# Patient Record
Sex: MALE | NOT HISPANIC OR LATINO | URBAN - METROPOLITAN AREA
[De-identification: names, ages, dates, MRNs, and addresses within clinical notes are randomized per-mention and may not be internally consistent; named-entity substitution may affect disease eponyms.]

---

## 2017-02-07 ENCOUNTER — OFFICE VISIT (OUTPATIENT)
Dept: OPHTHALMOLOGY | Facility: CLINIC | Age: 62
End: 2017-02-07
Attending: OPHTHALMOLOGY
Payer: MEDICAID

## 2017-02-07 DIAGNOSIS — H40.009 GLAUCOMA SUSPECT: ICD-10-CM

## 2017-02-07 DIAGNOSIS — H40.003 GLAUCOMA SUSPECT, BOTH EYES: Primary | ICD-10-CM

## 2017-02-07 DIAGNOSIS — H25.13 SENILE NUCLEAR SCLEROSIS, BILATERAL: ICD-10-CM

## 2017-02-07 PROCEDURE — 99214 OFFICE O/P EST MOD 30 MIN: CPT | Mod: 25,ZF

## 2017-02-07 PROCEDURE — 92083 EXTENDED VISUAL FIELD XM: CPT | Mod: ZF | Performed by: OPHTHALMOLOGY

## 2017-02-07 PROCEDURE — T1013 SIGN LANG/ORAL INTERPRETER: HCPCS | Mod: U3,ZF | Performed by: OPHTHALMOLOGY

## 2017-02-07 ASSESSMENT — VISUAL ACUITY
OS_CC: 20/50
METHOD: SNELLEN - LINEAR
CORRECTION_TYPE: GLASSES
OD_CC: 20/50

## 2017-02-07 ASSESSMENT — REFRACTION_WEARINGRX
OD_CYLINDER: +0.25
OS_CYLINDER: +1.00
OS_AXIS: 171
OS_SPHERE: -0.50
OD_ADD: +2.50
SPECS_TYPE: BIFOCAL
OD_SPHERE: +0.25
OS_ADD: +2.50
OD_AXIS: 175

## 2017-02-07 ASSESSMENT — CUP TO DISC RATIO
OS_RATIO: 0.85
OD_RATIO: 0.7

## 2017-02-07 ASSESSMENT — EXTERNAL EXAM - LEFT EYE: OS_EXAM: 1+ BROW PTOSIS

## 2017-02-07 ASSESSMENT — EXTERNAL EXAM - RIGHT EYE: OD_EXAM: 1+ BROW PTOSIS

## 2017-02-07 ASSESSMENT — SLIT LAMP EXAM - LIDS
COMMENTS: DERMATOCHALASIS
COMMENTS: DERMATOCHALASIS

## 2017-02-07 ASSESSMENT — TONOMETRY
IOP_METHOD: APPLANATION
OS_IOP_MMHG: 11
OD_IOP_MMHG: 11

## 2017-02-07 NOTE — PROGRESS NOTES
1)?LTG vs Glaucoma Suspect -- H/O noncompliance with gtts, Improved gtts compliance with wife putting gtts in, CT/MRI in 2010 showed Cerebral atrophy and was o/w WNL, eval with Dr. Hanks (etiology of visual field constriction felt to be likely 2/2 poor test taking as CVF is better) -- K pachy: 536/533   Tmax: 22/21    HVF: GVF:Severe constriction OS>>OD and GVF:[place] at HP, fair fixation ?etiology glc (GVF constriction out of proportion to ONH cupping ?2/2 poor test taking)     CDR:0.7/0.75     HRT/OCT: Severe RNFL thinning OU  (not worth repeating)     FHX of Glc:  No    Gonio: open      Intolerant to:      Asthma/COPD:Yes, on inhalers   Steroid Use: Yes, creams and nasal inhaled    Kidney Stones: No    Sulfa Allergy: No     IOP targets:?Mteens -- consider SLT given compliance issues, GVF constriction is out of proportion to cupping  2)DM s DR  3)Hx of Paranoid Schizophrenia, Anemia and latent TB s/p INH tx -- following with Dr. Sanchez (PCP)  4)NS OU -- ?etiology of vision loss    Patient will continue on Latanoprost which is a teal top drop at bedtime in both eyes and Simbrinza (Brinzolamide/Brimonidine) which is a white top drop 3x/day (8 hours apart) in both eyes.  Emphasized medication compliance (family members are administering drops). Patient will return to the glaucoma clinic in 8-12 months with IOL master, repeat IOP check, Henry Visual field test and dilated eye exam

## 2017-02-07 NOTE — NURSING NOTE
Chief Complaints and History of Present Illnesses   Patient presents with     Glaucoma Suspect Follow Up     8 month follow up both eyes     HPI    Affected eye(s):  Both   Symptoms:     Floaters (Comment: Still seeing floaters, no changes.)   No flashes   No redness   No Dryness         Do you have eye pain now?:  No      Comments:  Pt states vision is the same as last visit.  DMII BS: 125 this morning  A1C: unknown to pt.  No results found for this basename: a1c    Ailyn MANCILLA February 7, 2017 9:38 AM

## 2017-02-07 NOTE — MR AVS SNAPSHOT
After Visit Summary   2/7/2017    Shandra Santana    MRN: [id number]           Patient Information     Date Of Birth          1955        Visit Information        Provider Department      2/7/2017 9:15 AM Tiffanie Lizama MD; [facility]        Today's Diagnoses     Glaucoma suspect, both eyes    -  1     Glaucoma suspect         Senile nuclear sclerosis, bilateral           Care Instructions    Patient will continue on Latanoprost which is a teal top drop at bedtime in both eyes and Simbrinza (Brinzolamide/Brimonidine) which is a white top drop 3x/day (8 hours apart) in both eyes.  Emphasized medication compliance (family members are administering drops). Patient will return to the glaucoma clinic in 8-12 months with IOL master, repeat IOP check, Henry Visual field test and dilated eye exam          Follow-ups after your visit        Follow-up notes from your care team     Return for 8-12 months with IOL master, repeat IOP check, Henry Visual field test and dilated eye exam.      Your next 10 appointments already scheduled     Sep 12, 2017  9:30 AM   VISUAL FIELD with [facility] EYE VISUAL FIELD   Eye Clinic ([facility])    [address]  [address]  [facility] [address]   [phone]            Sep 12, 2017 10:00 AM   RETURN GLAUCOMA with Tiffanie Lizama MD   Eye Clinic ([facility])    [address]  [address] [facility]  [address] [address]  [facility] [address]   [phone]              Who to contact     Please call your clinic at [phone] to:    Ask questions about your health    Make or cancel appointments    Discuss your medicines    Learn about your test results    Speak to your doctor   If you have compliments or concerns about an experience at your clinic, or if you wish to file a complaint, please contact [facility] Patient Relations at [phone] or email us at  [email or web address]         Additional Information About Your Visit        [employer] Information     [employer] is an electronic gateway that provides easy, online access to your medical records. With [employer], you can request a clinic appointment, read your test results, renew a prescription or communicate with your care team.     To sign up for [employer] visit the website at [email or web address]   You will be asked to enter the access code listed below, as well as some personal information. Please follow the directions to create your username and password.     Your access code is: [email or web address]  Expires: 2017  9:00 AM     Your access code will  in 90 days. If you need help or a new code, please contact your [facility] or call [phone] for assistance.        Care EveryWhere ID     This is your Care EveryWhere ID. This could be used by other organizations to access your [place] medical records  [id number]         Blood Pressure from Last 3 Encounters:   No data found for BP    Weight from Last 3 Encounters:   No data found for Wt              We Performed the Following     Elaine HANLEY        Primary Care Provider Office Phone # Fax #    Lazaro Javed [phone]       [address] [address]        Thank you!     Thank you for choosing EYE CLINIC  for your care. Our goal is always to provide you with excellent care. Hearing back from our patients is one way we can continue to improve our services. Please take a few minutes to complete the written survey that you may receive in the mail after your visit with us. Thank you!             Your Updated Medication List - Protect others around you: Learn how to safely use, store and throw away your medicines at www.disposemymeds.org.          This list is accurate as of: 17 11:27 AM.  Always use your most recent med list.                   Brand Name  Dispense Instructions for use    albuterol 108 (90 BASE) MCG/ACT Inhaler   Generic drug:  albuterol      Inhale 2 puffs into the lungs as needed       AMITIZA 24 MCG capsule   Generic drug:  lubiprostone      Take 24 mcg by mouth 2 times daily With food       amLODIPine 5 MG tablet    NORVASC     Take 5 mg by mouth daily       brimonidine 0.15 % ophthalmic solution    ALPHAGAN-P    1 Bottle    Place 1 drop into both eyes 3 times daily       brinzolamide 1 % ophthalmic susp    AZOPT    1 Bottle    Place 1 drop into both eyes 3 times daily       brinzolamide-brimonidine 1-0.2 % ophthalmic suspension    SIMBRINZA    1 Bottle    Place 1 drop into both eyes 3 times daily       buPROPion 150 MG 24 hr tablet    WELLBUTRIN XL     Take 150 mg by mouth daily       calcium-vitamin D 250-125 MG-UNIT Tabs per tablet    OSCAL     Take 1 tablet by mouth daily       doxepin 25 MG capsule    SINEquan     Take 25 mg by mouth At Bedtime       DULCOLAX 10 MG Suppository   Generic drug:  bisacodyl      Place 10 mg rectally as needed       ferrous sulfate 325 (65 FE) MG tablet    IRON     Take 325 mg by mouth 3 times daily       GLIPIZIDE PO      Take 10 mg by mouth 2 times daily (before meals)       JANUVIA 100 MG tablet   Generic drug:  sitagliptin      Take 100 mg by mouth daily       ketoconazole 2 % cream    NIZORAL     Apply 1 Squirt topically 2 times daily       LISINOPRIL PO      Take 2.5 mg by mouth daily       LOXAPINE SUCCINATE PO      Take 25 mg by mouth 3 times daily       meclizine 25 MG tablet    ANTIVERT     Take 25 mg by mouth as needed BID PRN       METFORMIN HCL PO      Take 1,000 mg by mouth 2 times daily       metoprolol 25 MG tablet    LOPRESSOR     Take 25 mg by mouth 2 times daily       NAPROXEN PO      Take 500 mg by mouth 2 times daily       OMEPRAZOLE PO      Take 40 mg by mouth every morning (before breakfast)       * polyethylene glycol powder    MIRALAX/GLYCOLAX     Take 1 capful by mouth daily       *  polyethylene glycol powder    MIRALAX/GLYCOLAX     Take 17 g by mouth daily In 8oz of liquid       REMERON PO      Take 15 mg by mouth At Bedtime       sennosides 8.6 MG tablet    SENOKOT     Take 1 tablet by mouth daily       * SEROQUEL PO      Take 400 mg by mouth At Bedtime       * SEROQUEL PO      Take 50 mg by mouth daily       TRUSOPT 2 % ophthalmic solution   Generic drug:  dorzolamide      Place 2 drops into both eyes 3 times daily       * Notice:  This list has 4 medication(s) that are the same as other medications prescribed for you. Read the directions carefully, and ask your doctor or other care provider to review them with you.

## 2017-02-07 NOTE — PATIENT INSTRUCTIONS
Patient will continue on Latanoprost which is a teal top drop at bedtime in both eyes and Simbrinza (Brinzolamide/Brimonidine) which is a white top drop 3x/day (8 hours apart) in both eyes.  Emphasized medication compliance (family members are administering drops). Patient will return to the glaucoma clinic in 8-12 months with IOL master, repeat IOP check, Henry Visual field test and dilated eye exam

## 2017-08-08 DIAGNOSIS — H40.1131 PRIMARY OPEN ANGLE GLAUCOMA OF BOTH EYES, MILD STAGE: Primary | ICD-10-CM

## 2017-08-08 RX ORDER — BRINZOLAMIDE 10 MG/ML
1 SUSPENSION/ DROPS OPHTHALMIC 3 TIMES DAILY
Qty: 10 ML | Refills: 4 | Status: SHIPPED | OUTPATIENT
Start: 2017-08-08 | End: 2017-11-21 | Stop reason: DRUGHIGH

## 2017-11-21 ENCOUNTER — OFFICE VISIT (OUTPATIENT)
Dept: OPHTHALMOLOGY | Facility: CLINIC | Age: 62
End: 2017-11-21
Attending: OPHTHALMOLOGY
Payer: MEDICAID

## 2017-11-21 DIAGNOSIS — H25.13 SENILE NUCLEAR SCLEROSIS, BILATERAL: ICD-10-CM

## 2017-11-21 DIAGNOSIS — H40.003 GLAUCOMA SUSPECT, BOTH EYES: Primary | ICD-10-CM

## 2017-11-21 DIAGNOSIS — H40.003 GLAUCOMA SUSPECT OF BOTH EYES: ICD-10-CM

## 2017-11-21 PROCEDURE — 99214 OFFICE O/P EST MOD 30 MIN: CPT | Mod: 25,ZF

## 2017-11-21 PROCEDURE — T1013 SIGN LANG/ORAL INTERPRETER: HCPCS | Mod: U3,ZF | Performed by: OPHTHALMOLOGY

## 2017-11-21 PROCEDURE — T1013 SIGN LANG/ORAL INTERPRETER: HCPCS | Mod: U3,ZF

## 2017-11-21 PROCEDURE — 76516 ECHO EXAM OF EYE: CPT | Mod: ZF | Performed by: OPHTHALMOLOGY

## 2017-11-21 PROCEDURE — 92083 EXTENDED VISUAL FIELD XM: CPT | Mod: ZF | Performed by: OPHTHALMOLOGY

## 2017-11-21 RX ORDER — LATANOPROST 50 UG/ML
1 SOLUTION/ DROPS OPHTHALMIC AT BEDTIME
COMMUNITY
End: 2018-02-15

## 2017-11-21 ASSESSMENT — TONOMETRY
OS_IOP_MMHG: 22
IOP_METHOD: TONOPEN
OD_IOP_MMHG: 21

## 2017-11-21 ASSESSMENT — REFRACTION_WEARINGRX
OS_AXIS: 171
OS_SPHERE: -0.50
OD_CYLINDER: +0.25
OD_SPHERE: +0.25
OD_ADD: +2.50
SPECS_TYPE: BIFOCAL
OS_CYLINDER: +1.00
OS_ADD: +2.50
OD_AXIS: 175

## 2017-11-21 ASSESSMENT — VISUAL ACUITY
OS_CC: 20/50
OD_CC: 20/70
OD_CC+: +1
OS_CC+: -2
CORRECTION_TYPE: GLASSES
METHOD: SNELLEN - LINEAR

## 2017-11-21 ASSESSMENT — EXTERNAL EXAM - RIGHT EYE: OD_EXAM: 1+ BROW PTOSIS

## 2017-11-21 ASSESSMENT — SLIT LAMP EXAM - LIDS
COMMENTS: DERMATOCHALASIS
COMMENTS: DERMATOCHALASIS

## 2017-11-21 ASSESSMENT — CONF VISUAL FIELD
METHOD: COUNTING FINGERS
OD_NORMAL: 1
OS_NORMAL: 1

## 2017-11-21 ASSESSMENT — EXTERNAL EXAM - LEFT EYE: OS_EXAM: 1+ BROW PTOSIS

## 2017-11-21 ASSESSMENT — CUP TO DISC RATIO
OS_RATIO: 0.7
OD_RATIO: 0.7

## 2017-11-21 NOTE — NURSING NOTE
Chief Complaints and History of Present Illnesses   Patient presents with     Glaucoma Follow Up     9 month follow up both eyes     HPI    Affected eye(s):  Both   Symptoms:     No floaters   No flashes   No redness   No Dryness         Do you have eye pain now?:  No      Comments:  Pt states vision is the same as last visit. No eye pain today. Pt feeling very dizzy for the past few months. Pt notes that over the past 2 weeks dizziness has become worse.  DM2 BS: 150 yesterday morning.  A1C: unknown to pt.  No results found for: A1C    Ailyn MANCILLA November 21, 2017 7:37 AM

## 2017-11-21 NOTE — MR AVS SNAPSHOT
After Visit Summary   11/21/2017    Shandra Santana    MRN: [id number]           Patient Information     Date Of Birth          1955        Visit Information        Provider Department      11/21/2017 7:45 AM Tiffanie Lizama MD; [facility]        Today's Diagnoses     Glaucoma suspect, both eyes    -  1    Glaucoma suspect of both eyes        Senile nuclear sclerosis, bilateral          Care Instructions    Patient will continue on Latanoprost which is a teal top drop at bedtime in both eyes and Simbrinza (Brinzolamide/Brimonidine) which is a white top drop 3x/day (8 hours apart) in both eyes.  Emphasized medication compliance (family members are administering drops). Patient will return to the glaucoma clinic in 1-2 months with repeat IOP check.            Follow-ups after your visit        Follow-up notes from your care team     Return 2 months with repeat IOP check.      Your next 10 appointments already scheduled     Jan 23, 2018  8:00 AM CST   RETURN GLAUCOMA with Tiffanie Lizama MD   Eye Clinic ([facility])    [address] [facility]  [address] [facility]  [address] Clin [address]  [facility] [address] [phone]              Who to contact     Please call your clinic at [phone] to:    Ask questions about your health    Make or cancel appointments    Discuss your medicines    Learn about your test results    Speak to your doctor   If you have compliments or concerns about an experience at your clinic, or if you wish to file a complaint, please contact [facility] Patient Relations at [phone] or email us at [email or web address]         Additional Information About Your Visit        [employer] Information     [employer] is an electronic gateway that provides easy, online access to your medical records. With [employer], you can request a clinic appointment, read your test results, renew a prescription or communicate with  your care team.     To sign up for [employer] visit the website at [email or web address]   You will be asked to enter the access code listed below, as well as some personal information. Please follow the directions to create your username and password.     Your access code is: [email or web address]  Expires: 2017  5:30 AM     Your access code will  in 90 days. If you need help or a new code, please contact your [facility] or call [phone] for assistance.        Care EveryWhere ID     This is your Care EveryWhere ID. This could be used by other organizations to access your [place] medical records  [id number]         Blood Pressure from Last 3 Encounters:   No data found for BP    Weight from Last 3 Encounters:   No data found for Wt              We Performed the Following     Biometry w/o IOL calc OU (both eyes)     Henry VF OU          Today's Medication Changes          These changes are accurate as of: 17  9:43 AM.  If you have any questions, ask your nurse or doctor.               Stop taking these medicines if you haven't already. Please contact your care team if you have questions.     brinzolamide 1 % ophthalmic susp   Commonly known as:  AZOPT   Stopped by:  Tiffanie Lizama MD                    Primary Care Provider Office Phone # Fax #    Lazaro Burt [phone]       [address] [address]        Equal Access to Services     АННА CORTEZ AH: Hadii aad ku hadasho Sosherinali, waaxda luqadaha, qaybta kaalmada adeegyada, tanesha bello. So [facility] [phone].    ATENCIÓN: Si habla español, tiene a rowland disposición servicios gratuitos de asistencia lingüística. Arlene al [phone].    We comply with applicable federal civil rights laws and [place] laws. We do not discriminate on the basis of race, color, national origin, age, disability, sex, sexual orientation, or gender  identity.            Thank you!     Thank you for choosing EYE CLINIC  for your care. Our goal is always to provide you with excellent care. Hearing back from our patients is one way we can continue to improve our services. Please take a few minutes to complete the written survey that you may receive in the mail after your visit with us. Thank you!             Your Updated Medication List - Protect others around you: Learn how to safely use, store and throw away your medicines at www.disposemymeds.org.          This list is accurate as of: 11/21/17  9:43 AM.  Always use your most recent med list.                   Brand Name Dispense Instructions for use Diagnosis    AMITIZA 24 MCG capsule   Generic drug:  lubiprostone      Take 24 mcg by mouth 2 times daily With food        amLODIPine 5 MG tablet    NORVASC     Take 5 mg by mouth daily        brinzolamide-brimonidine 1-0.2 % ophthalmic suspension    SIMBRINZA    1 Bottle    Place 1 drop into both eyes 3 times daily    Low-tension glaucoma of both eyes, unspecified glaucoma stage       buPROPion 150 MG 24 hr tablet    WELLBUTRIN XL     Take 150 mg by mouth daily        calcium-vitamin D 250-125 MG-UNIT Tabs per tablet    OSCAL     Take 1 tablet by mouth daily        doxepin 25 MG capsule    SINEquan     Take 25 mg by mouth At Bedtime        DULCOLAX 10 MG Suppository   Generic drug:  bisacodyl      Place 10 mg rectally as needed        ferrous sulfate 325 (65 FE) MG tablet    IRON     Take 325 mg by mouth 3 times daily        GLIPIZIDE PO      Take 10 mg by mouth 2 times daily (before meals)        JANUVIA 100 MG tablet   Generic drug:  sitagliptin      Take 100 mg by mouth daily        ketoconazole 2 % cream    NIZORAL     Apply 1 Squirt topically 2 times daily        latanoprost 0.005 % ophthalmic solution    XALATAN     Place 1 drop into both eyes At Bedtime        LISINOPRIL PO      Take 2.5 mg by mouth daily        LOXAPINE SUCCINATE PO      Take 25 mg by mouth  3 times daily        meclizine 25 MG tablet    ANTIVERT     Take 25 mg by mouth as needed BID PRN        METFORMIN HCL PO      Take 1,000 mg by mouth 2 times daily        metoprolol 25 MG tablet    LOPRESSOR     Take 25 mg by mouth 2 times daily        NAPROXEN PO      Take 500 mg by mouth 2 times daily        OMEPRAZOLE PO      Take 40 mg by mouth every morning (before breakfast)        * polyethylene glycol powder    MIRALAX/GLYCOLAX     Take 1 capful by mouth daily        * polyethylene glycol powder    MIRALAX/GLYCOLAX     Take 17 g by mouth daily In 8oz of liquid        PROAIR  (90 BASE) MCG/ACT Inhaler   Generic drug:  albuterol      Inhale 2 puffs into the lungs as needed        REMERON PO      Take 15 mg by mouth At Bedtime        sennosides 8.6 MG tablet    SENOKOT     Take 1 tablet by mouth daily        * SEROQUEL PO      Take 400 mg by mouth At Bedtime        * SEROQUEL PO      Take 50 mg by mouth daily        * Notice:  This list has 4 medication(s) that are the same as other medications prescribed for you. Read the directions carefully, and ask your doctor or other care provider to review them with you.

## 2017-11-21 NOTE — PATIENT INSTRUCTIONS
Patient will continue on Latanoprost which is a teal top drop at bedtime in both eyes and Simbrinza (Brinzolamide/Brimonidine) which is a white top drop 3x/day (8 hours apart) in both eyes.  Emphasized medication compliance (family members are administering drops). Patient will return to the glaucoma clinic in 1-2 months with repeat IOP check.

## 2017-11-21 NOTE — PROGRESS NOTES
1)?LTG vs Glaucoma Suspect -- H/O noncompliance with gtts, Improved gtts compliance with wife putting gtts in, CT/MRI in 2010 showed Cerebral atrophy and was o/w WNL, eval with Dr. Hanks (etiology of visual field constriction felt to be likely 2/2 poor test taking as CVF is better) -- K pachy: 536/533   Tmax: 22/22    HVF: GVF:Severe constriction OS>>OD and GVF:[place] at HP, fair fixation ?etiology glc (GVF constriction out of proportion to ONH cupping ?2/2 poor test taking)     CDR:0.7/0.75     HRT/OCT: Severe RNFL thinning OU  (not worth repeating)     FHX of Glc:  No    Gonio: open      Intolerant to:      Asthma/COPD:Yes, on inhalers   Steroid Use: Yes, creams and nasal inhaled    Kidney Stones: No    Sulfa Allergy: No     IOP targets:?Mteens -- consider SLT given compliance issues, GVF constriction is out of proportion to cupping  2)DM s DR  3)Hx of Paranoid Schizophrenia, Anemia and latent TB s/p INH tx -- following with Dr. Sanchez (PCP)  4)NS OU -- ?etiology of vision loss -- pt doesn't wear glasses most of the time per son    MD: IOP above target likely 2/2 missed AM gtts    Patient will continue on Latanoprost which is a teal top drop at bedtime in both eyes and Simbrinza (Brinzolamide/Brimonidine) which is a white top drop 3x/day (8 hours apart) in both eyes.  Emphasized medication compliance (family members are administering drops). Patient will return to the glaucoma clinic in 1-2 months with repeat IOP check.    Attending Physician Attestation:  Complete documentation of historical and exam elements from today's encounter can be found in the full encounter summary report (not reduplicated in this progress note). I personally obtained the chief complaint(s) and history of present illness.  I confirmed and edited as necessary the review of systems, past medical/surgical history, family history, social history, and examination findings as documented by others; and I examined the patient  myself. I personally reviewed the relevant tests, images, and reports as documented above. I formulated and edited as necessary the assessment and plan and discussed the findings and management plan with the patient and family.  - Tiffanie Lizama MD

## 2018-02-15 ENCOUNTER — OFFICE VISIT (OUTPATIENT)
Dept: OPHTHALMOLOGY | Facility: CLINIC | Age: 63
End: 2018-02-15
Attending: OPHTHALMOLOGY
Payer: MEDICAID

## 2018-02-15 DIAGNOSIS — H25.13 SENILE NUCLEAR SCLEROSIS, BILATERAL: ICD-10-CM

## 2018-02-15 DIAGNOSIS — H40.003 GLAUCOMA SUSPECT, BOTH EYES: Primary | ICD-10-CM

## 2018-02-15 PROCEDURE — T1013 SIGN LANG/ORAL INTERPRETER: HCPCS | Mod: U3,ZF | Performed by: OPHTHALMOLOGY

## 2018-02-15 PROCEDURE — G0463 HOSPITAL OUTPT CLINIC VISIT: HCPCS | Mod: ZF

## 2018-02-15 ASSESSMENT — SLIT LAMP EXAM - LIDS
COMMENTS: DERMATOCHALASIS
COMMENTS: DERMATOCHALASIS

## 2018-02-15 ASSESSMENT — VISUAL ACUITY
OD_CC+: -1
OD_CC: 20/60
CORRECTION_TYPE: GLASSES
OS_CC: 20/50
METHOD: SNELLEN - LINEAR

## 2018-02-15 ASSESSMENT — EXTERNAL EXAM - LEFT EYE: OS_EXAM: 1+ BROW PTOSIS

## 2018-02-15 ASSESSMENT — TONOMETRY
IOP_METHOD: APPLANATION
OD_IOP_MMHG: 19
OS_IOP_MMHG: 21

## 2018-02-15 ASSESSMENT — REFRACTION_WEARINGRX
OD_ADD: +2.50
OS_ADD: +2.50
OS_AXIS: 171
OS_SPHERE: -0.50
OS_CYLINDER: +1.00
OD_AXIS: 175
SPECS_TYPE: BIFOCAL
OD_CYLINDER: +0.25
OD_SPHERE: +0.25

## 2018-02-15 ASSESSMENT — EXTERNAL EXAM - RIGHT EYE: OD_EXAM: 1+ BROW PTOSIS

## 2018-02-15 ASSESSMENT — CONF VISUAL FIELD
OS_NORMAL: 1
METHOD: TOYS
OD_NORMAL: 1

## 2018-02-15 NOTE — PATIENT INSTRUCTIONS
Patient will continue on Latanoprost which is a teal top drop at bedtime (9PM) in both eyes and Simbrinza (Brinzolamide/Brimonidine) which is a white top drop 3x/day (8 hours apart, 6-7AM, 1PM, 8-9PM) in both eyes.  Emphasized medication compliance (family members are administering drops). Patient will return to the glaucoma clinic in 1-2 months with repeat IOP check.

## 2018-02-15 NOTE — MR AVS SNAPSHOT
After Visit Summary   2/15/2018    Shandra Santana    MRN: [id number]           Patient Information     Date Of Birth          1955        Visit Information        Provider Department      2/15/2018 7:45 AM Tiffanie Lizama MD; [facility]        Today's Diagnoses     Glaucoma suspect, both eyes    -  1    Senile nuclear sclerosis, bilateral          Care Instructions    Patient will continue on Latanoprost which is a teal top drop at bedtime (9PM) in both eyes and Simbrinza (Brinzolamide/Brimonidine) which is a white top drop 3x/day (8 hours apart, 6-7AM, 1PM, 8-9PM) in both eyes.  Emphasized medication compliance (family members are administering drops). Patient will return to the glaucoma clinic in 1-2 months with repeat IOP check.          Follow-ups after your visit        Follow-up notes from your care team     Return 2 months with repeat IOP check..      Who to contact     Please call your clinic at [phone] to:    Ask questions about your health    Make or cancel appointments    Discuss your medicines    Learn about your test results    Speak to your doctor            Additional Information About Your Visit        MyChart Information     [employer] is an electronic gateway that provides easy, online access to your medical records. With Wyle, you can request a clinic appointment, read your test results, renew a prescription or communicate with your care team.     To sign up for Wyle visit the website at [email or web address]   You will be asked to enter the access code listed below, as well as some personal information. Please follow the directions to create your username and password.     Your access code is: [email or web address]  Expires: 2018  6:30 AM     Your access code will  in 90 days. If you need help or a new code, please contact your [facility] or call [phone] for assistance.        Care EveryWhere ID     This is  your Care EveryWhere ID. This could be used by other organizations to access your [facility] medical records  [id number]         Blood Pressure from Last 3 Encounters:   No data found for BP    Weight from Last 3 Encounters:   No data found for Wt              Today, you had the following     No orders found for display       Primary Care Provider Office Phone # Fax #    Lazaro Burt [phone]       [address] [address]        Equal Access to Services     АННА CORTEZ : Hadii aad ku hadasho Soomaali, waaxda luqadaha, qaybta kaalmada adeegyada, waxay idiin hayaan adeeg kharash la'jannan . So [facility] [phone].    ATENCIÓN: Si habla español, tiene a rowland disposición servicios gratuitos de asistencia lingüística. [facility] [phone].    We comply with applicable federal civil rights laws and [place] laws. We do not discriminate on the basis of race, color, national origin, age, disability, sex, sexual orientation, or gender identity.            Thank you!     Thank you for choosing [facility]  for your care. Our goal is always to provide you with excellent care. Hearing back from our patients is one way we can continue to improve our services. Please take a few minutes to complete the written survey that you may receive in the mail after your visit with us. Thank you!             Your Updated Medication List - Protect others around you: Learn how to safely use, store and throw away your medicines at www.disposemymeds.org.          This list is accurate as of 2/15/18  8:31 AM.  Always use your most recent med list.                   Brand Name Dispense Instructions for use Diagnosis    AMITIZA 24 MCG capsule   Generic drug:  lubiprostone      Take 24 mcg by mouth 2 times daily With food        amLODIPine 5 MG tablet    NORVASC     Take 5 mg by mouth daily        brinzolamide-brimonidine 1-0.2 % ophthalmic suspension    SIMBRINZA    1 Bottle    Place 1 drop into  both eyes 3 times daily    Low-tension glaucoma of both eyes, unspecified glaucoma stage       buPROPion 150 MG 24 hr tablet    WELLBUTRIN XL     Take 150 mg by mouth daily        calcium-vitamin D 250-125 MG-UNIT Tabs per tablet    OSCAL     Take 1 tablet by mouth daily        doxepin 25 MG capsule    SINEquan     Take 25 mg by mouth At Bedtime        DULCOLAX 10 MG Suppository   Generic drug:  bisacodyl      Place 10 mg rectally as needed        ferrous sulfate 325 (65 FE) MG tablet    IRON     Take 325 mg by mouth 3 times daily        GLIPIZIDE PO      Take 10 mg by mouth 2 times daily (before meals)        JANUVIA 100 MG tablet   Generic drug:  sitagliptin      Take 100 mg by mouth daily        ketoconazole 2 % cream    NIZORAL     Apply 1 Squirt topically 2 times daily        latanoprost 0.005 % ophthalmic solution    XALATAN     Place 1 drop into both eyes At Bedtime        LISINOPRIL PO      Take 2.5 mg by mouth daily        LOXAPINE SUCCINATE PO      Take 25 mg by mouth 3 times daily        meclizine 25 MG tablet    ANTIVERT     Take 25 mg by mouth as needed BID PRN        METFORMIN HCL PO      Take 1,000 mg by mouth 2 times daily        metoprolol tartrate 25 MG tablet    LOPRESSOR     Take 25 mg by mouth 2 times daily        NAPROXEN PO      Take 500 mg by mouth 2 times daily        OMEPRAZOLE PO      Take 40 mg by mouth every morning (before breakfast)        * polyethylene glycol powder    MIRALAX/GLYCOLAX     Take 1 capful by mouth daily        * polyethylene glycol powder    MIRALAX/GLYCOLAX     Take 17 g by mouth daily In 8oz of liquid        PROAIR  (90 BASE) MCG/ACT Inhaler   Generic drug:  albuterol      Inhale 2 puffs into the lungs as needed        REMERON PO      Take 15 mg by mouth At Bedtime        sennosides 8.6 MG tablet    SENOKOT     Take 1 tablet by mouth daily        * SEROQUEL PO      Take 400 mg by mouth At Bedtime        * SEROQUEL PO      Take 50 mg by mouth daily        *  Notice:  This list has 4 medication(s) that are the same as other medications prescribed for you. Read the directions carefully, and ask your doctor or other care provider to review them with you.

## 2018-02-15 NOTE — PROGRESS NOTES
1)?LTG vs Glaucoma Suspect -- H/O noncompliance with gtts, Improved gtts compliance with wife putting gtts in, CT/MRI in 2010 showed Cerebral atrophy and was o/w WNL, eval with Dr. Hanks (etiology of visual field constriction felt to be likely 2/2 poor test taking as CVF is better) -- K pachy: 536/533   Tmax: 22/22    HVF: GVF:Severe constriction OS>>OD and GVF:[place] at [place], fair fixation ?etiology glc (GVF constriction out of proportion to ONH cupping ?2/2 poor test taking)     CDR:0.7/0.75     HRT/OCT: Severe RNFL thinning OU  (not worth repeating)     FHX of Glc:  No    Gonio: open      Intolerant to:      Asthma/COPD:Yes, on inhalers   Steroid Use: Yes, creams and nasal inhaled    Kidney Stones: No    Sulfa Allergy: No     IOP targets:?Mteens -- consider SLT given compliance issues, GVF constriction is out of proportion to cupping  2)DM s DR  3)Hx of Paranoid Schizophrenia, Anemia and latent TB s/p INH tx -- following with Dr. Sanchez (PCP)  4)NS OU -- ?etiology of vision loss -- pt doesn't wear glasses most of the time per son    MD: IOP above target likely 2/2 missed AM gtts -- for second time -- using both drops 3x/day     Patient will continue on Latanoprost which is a teal top drop at bedtime (8-9PM) in both eyes and Simbrinza (Brinzolamide/Brimonidine) which is a white top drop 3x/day (8 hours apart, 6-7AM, 1PM, 8-9PM) in both eyes.  Emphasized medication compliance (family members are administering drops). Patient will return to the glaucoma clinic in 1-2 months with repeat IOP check.    Attending Physician Attestation:  Complete documentation of historical and exam elements from today's encounter can be found in the full encounter summary report (not reduplicated in this progress note). I personally obtained the chief complaint(s) and history of present illness.  I confirmed and edited as necessary the review of systems, past medical/surgical history, family history, social history, and  examination findings as documented by others; and I examined the patient myself. I personally reviewed the relevant tests, images, and reports as documented above. I formulated and edited as necessary the assessment and plan and discussed the findings and management plan with the patient and family.  - Tiffanie Lizama MD

## 2018-02-15 NOTE — NURSING NOTE
Chief Complaints and History of Present Illnesses   Patient presents with     Follow Up For     Glaucoma suspect     HPI    Affected eye(s):  Both   Symptoms:        Frequency:  Constant       Do you have eye pain now?:  No      Comments:  States va is the same since last visit  +watery occ  Teri Jones COT 8:05 AM February 15, 2018

## 2018-05-10 DIAGNOSIS — H40.003 GLAUCOMA SUSPECT OF BOTH EYES: ICD-10-CM

## 2018-05-11 RX ORDER — LATANOPROST 50 UG/ML
SOLUTION/ DROPS OPHTHALMIC
Qty: 7.5 ML | Refills: 0 | Status: SHIPPED | OUTPATIENT
Start: 2018-05-11

## 2018-05-11 NOTE — TELEPHONE ENCOUNTER
Medication: xalatan    Last Written Prescription Date:  2/15/18  Last Fill Quantity: 2.5,   # refills: 2    Last Office Visit: 2/15/18  Future Office visit: no future appt

## 2018-05-18 DIAGNOSIS — H40.003 GLAUCOMA SUSPECT OF BOTH EYES: Primary | ICD-10-CM

## 2018-05-21 NOTE — TELEPHONE ENCOUNTER
Medication:   brimonidine (ALPHAGAN-P) 0.15 % ophthalmic solution   Last Written Prescription Date:  8/2/16  Last Fill Quantity: 1 bottle   # refills: 11  Last Office Visit: 2/15/18  Future Office visit: none    Attending Provider: Alda  Not mentioned in last note    Routing refill request to provider for review/approval because:  Not on medication list  Med end date  11/21/17     Dx?

## 2018-05-22 RX ORDER — BRIMONIDINE TARTRATE 1.5 MG/ML
SOLUTION/ DROPS OPHTHALMIC
Qty: 10 ML | Refills: 0 | Status: SHIPPED | OUTPATIENT
Start: 2018-05-22